# Patient Record
(demographics unavailable — no encounter records)

---

## 2025-02-20 NOTE — PHYSICAL EXAM
[Chaperone Present] : A chaperone was present in the examining room during all aspects of the physical examination [[id number]] : A chaperone was present during the pelvic exam. [Appropriately responsive] : appropriately responsive [Alert] : alert [No Acute Distress] : no acute distress [Soft] : soft [Non-tender] : non-tender [Non-distended] : non-distended [No Mass] : no mass [Oriented x3] : oriented x3 [Examination Of The Breasts] : a normal appearance [No Masses] : no breast masses were palpable [Labia Majora] : normal [Labia Minora] : normal [No Bleeding] : There was no active vaginal bleeding [Normal] : normal [Uterine Adnexae] : non-palpable [FreeTextEntry2] : MAO Cam [Tenderness] : nontender

## 2025-02-20 NOTE — HISTORY OF PRESENT ILLNESS
[N] : Patient denies prior pregnancies [Oral Contraceptive] : uses oral contraception pills [Y] : Patient is sexually active [FreeTextEntry1] : Tish is a 21 y.o. G0 female here for annual exam.  Sexually active with male partner, declines STI screening. Using LANCE for contraception, happy with her pill.   First pap today per [facility]CCP guidelines, reviewed same and pt. amenable to exam and screening. Unsure Gardasil status.   Denies family hx breast, ovarian, colorectal CA. Denies vaginal, bowel or urinary concerns.   Working and going to college, nonsmoker, exercising regularly.  [GonorrheaDate] : 12/08/2023 [TextBox_63] : Negative - [ChlamydiaDate] : 12/08/2023 [TextBox_68] : Negative - [LMPDate] : 01/22/25 [PGHxTotal] : 0

## 2025-02-20 NOTE — PLAN
[FreeTextEntry1] : -F/u pap -Rx LANCE renewed with correct use reviewed  -Review Gardasil with parents, additional literature provided  -Recommended dermatology for routine, annual skin survey -Routine physical activity encouraged -RTO one year or sooner PRN for any new problems, questions or concerns